# Patient Record
Sex: FEMALE | Race: WHITE
[De-identification: names, ages, dates, MRNs, and addresses within clinical notes are randomized per-mention and may not be internally consistent; named-entity substitution may affect disease eponyms.]

---

## 2019-02-21 ENCOUNTER — HOSPITAL ENCOUNTER (INPATIENT)
Dept: HOSPITAL 84 - D.ER | Age: 74
LOS: 4 days | Discharge: HOME | DRG: 689 | End: 2019-02-25
Attending: FAMILY MEDICINE | Admitting: FAMILY MEDICINE
Payer: COMMERCIAL

## 2019-02-21 VITALS
HEIGHT: 65 IN | BODY MASS INDEX: 23.32 KG/M2 | BODY MASS INDEX: 23.32 KG/M2 | WEIGHT: 140 LBS | WEIGHT: 140 LBS | BODY MASS INDEX: 23.32 KG/M2 | HEIGHT: 65 IN

## 2019-02-21 VITALS — SYSTOLIC BLOOD PRESSURE: 151 MMHG | DIASTOLIC BLOOD PRESSURE: 72 MMHG

## 2019-02-21 VITALS — SYSTOLIC BLOOD PRESSURE: 133 MMHG | DIASTOLIC BLOOD PRESSURE: 66 MMHG

## 2019-02-21 DIAGNOSIS — G30.9: ICD-10-CM

## 2019-02-21 DIAGNOSIS — E87.6: ICD-10-CM

## 2019-02-21 DIAGNOSIS — R50.9: ICD-10-CM

## 2019-02-21 DIAGNOSIS — I10: ICD-10-CM

## 2019-02-21 DIAGNOSIS — R40.2213: ICD-10-CM

## 2019-02-21 DIAGNOSIS — F02.80: ICD-10-CM

## 2019-02-21 DIAGNOSIS — N39.0: Primary | ICD-10-CM

## 2019-02-21 DIAGNOSIS — E11.9: ICD-10-CM

## 2019-02-21 DIAGNOSIS — H54.7: ICD-10-CM

## 2019-02-21 DIAGNOSIS — R41.82: ICD-10-CM

## 2019-02-21 LAB
ALBUMIN SERPL-MCNC: 2.8 G/DL (ref 3.4–5)
ALP SERPL-CCNC: 131 U/L (ref 46–116)
ALT SERPL-CCNC: 15 U/L (ref 10–68)
ANION GAP SERPL CALC-SCNC: 16.6 MMOL/L (ref 8–16)
APPEARANCE UR: CLEAR
BACTERIA #/AREA URNS HPF: (no result) /HPF
BASOPHILS NFR BLD AUTO: 0.1 % (ref 0–2)
BILIRUB SERPL-MCNC: 0.36 MG/DL (ref 0.2–1.3)
BILIRUB SERPL-MCNC: NEGATIVE MG/DL
BUN SERPL-MCNC: 13 MG/DL (ref 7–18)
CALCIUM SERPL-MCNC: 8.1 MG/DL (ref 8.5–10.1)
CHLORIDE SERPL-SCNC: 104 MMOL/L (ref 98–107)
CO2 SERPL-SCNC: 23.9 MMOL/L (ref 21–32)
COLOR UR: YELLOW
CREAT SERPL-MCNC: 1 MG/DL (ref 0.6–1.3)
EOSINOPHIL NFR BLD: 0.1 % (ref 0–7)
ERYTHROCYTE [DISTWIDTH] IN BLOOD BY AUTOMATED COUNT: 13 % (ref 11.5–14.5)
GLOBULIN SER-MCNC: 5.2 G/L
GLUCOSE SERPL-MCNC: 144 MG/DL (ref 74–106)
GLUCOSE SERPL-MCNC: NEGATIVE MG/DL
HCT VFR BLD CALC: 37.7 % (ref 36–48)
HGB BLD-MCNC: 12.5 G/DL (ref 12–16)
IMM GRANULOCYTES NFR BLD: 0.2 % (ref 0–5)
KETONES UR STRIP-MCNC: NEGATIVE MG/DL
LYMPHOCYTES NFR BLD AUTO: 8.8 % (ref 15–50)
MCH RBC QN AUTO: 30.9 PG (ref 26–34)
MCHC RBC AUTO-ENTMCNC: 33.2 G/DL (ref 31–37)
MCV RBC: 93.3 FL (ref 80–100)
MONOCYTES NFR BLD: 4.7 % (ref 2–11)
NEUTROPHILS NFR BLD AUTO: 86.1 % (ref 40–80)
NITRITE UR-MCNC: NEGATIVE MG/ML
OSMOLALITY SERPL CALC.SUM OF ELEC: 283 MOSM/KG (ref 275–300)
PH UR STRIP: 6 [PH] (ref 5–6)
PLATELET # BLD: 217 10X3/UL (ref 130–400)
PMV BLD AUTO: 11.2 FL (ref 7.4–10.4)
POTASSIUM SERPL-SCNC: 3.5 MMOL/L (ref 3.5–5.1)
PROT SERPL-MCNC: 8 G/DL (ref 6.4–8.2)
PROT UR-MCNC: (no result) MG/DL
RBC # BLD AUTO: 4.04 10X6/UL (ref 4–5.4)
RBC #/AREA URNS HPF: (no result) /HPF (ref 0–5)
SODIUM SERPL-SCNC: 141 MMOL/L (ref 136–145)
SP GR UR STRIP: 1.02 (ref 1–1.02)
SQUAMOUS #/AREA URNS HPF: (no result) /HPF (ref 0–5)
TSH SERPL-ACNC: 1.62 UIU/ML (ref 0.36–3.74)
UROBILINOGEN UR-MCNC: NORMAL MG/DL
WBC # BLD AUTO: 10 10X3/UL (ref 4.8–10.8)
WBC #/AREA URNS HPF: (no result) /HPF (ref 0–5)

## 2019-02-21 NOTE — NUR
Pt's adult diaper saturated upon arrival. Pt was changed and cleaned. Wet linen
was removed. Dry blue pads placed beneath pt, and dry adult diaper placed on
pt. Pt has redness to coccyx and groin area.

## 2019-02-21 NOTE — NUR
ADMISSION ASSESSMENT COMPLETED CAREGIVER AT BEDSIDE, PT UNABLE TO ANSWER
QUESTIONS DUE TO MENTAL STATUS, BLIND ABLE TO FOLLOW SOME COMMANDS, IV
INFUSING WITHOUT DIFFICULTY O2 VIA N/C AT 2 L, RESP UNLABOARED LUNGS
DEMINISHED, INCONTIENT OF BOWEL AND BLADDER BED CHANGED, CALL LIGHT IN REACH

## 2019-02-21 NOTE — NUR
PT TRANSPORTED TO FLOOR WITH ISOLATION PRECAUTION DUE TO PT BEING TX FOR FLU.
NOTE PT FLU SWAB WAS NEGATIVE, HOWEVER SINCE BEING TX FOR FLU FOLLOWED
ISOLATION PROTOCOL WITH MASK AND GOWN ON PT. NURSE NOTIFIED OF THIS AT REPORT
AND TRANSPORT.

## 2019-02-21 NOTE — NUR
ARRIVED TO ROOM 2224 VIA STRETCHER AWAKE AND ALERT TO NAME ONLY. RESP EVEN
AND UNLABORED WITH 02 IN USE VIA N/C. NO PAIN OR DISCOMFORT NOTED OR VOICED.
INCONT OF BOWEL AND BLADDER. MEDIUM BM NOTED. PT UNABLE TO WORK CALL LIGHT
SYSTEM HAS CARE GIVER AT BEDSIDE. C/L IN REACH.

## 2019-02-21 NOTE — NUR
Pt resting with family at bedside. Pt respirations even and unlabored. Pt shows
no signs of distress at this time.

## 2019-02-22 VITALS — DIASTOLIC BLOOD PRESSURE: 63 MMHG | SYSTOLIC BLOOD PRESSURE: 133 MMHG

## 2019-02-22 VITALS — DIASTOLIC BLOOD PRESSURE: 50 MMHG | SYSTOLIC BLOOD PRESSURE: 120 MMHG

## 2019-02-22 VITALS — SYSTOLIC BLOOD PRESSURE: 135 MMHG | DIASTOLIC BLOOD PRESSURE: 64 MMHG

## 2019-02-22 VITALS — SYSTOLIC BLOOD PRESSURE: 167 MMHG | DIASTOLIC BLOOD PRESSURE: 87 MMHG

## 2019-02-22 VITALS — DIASTOLIC BLOOD PRESSURE: 58 MMHG | SYSTOLIC BLOOD PRESSURE: 115 MMHG

## 2019-02-22 VITALS — DIASTOLIC BLOOD PRESSURE: 59 MMHG | SYSTOLIC BLOOD PRESSURE: 139 MMHG

## 2019-02-22 LAB
ALBUMIN SERPL-MCNC: 2.4 G/DL (ref 3.4–5)
ALP SERPL-CCNC: 102 U/L (ref 46–116)
ALT SERPL-CCNC: 14 U/L (ref 10–68)
ANION GAP SERPL CALC-SCNC: 14.6 MMOL/L (ref 8–16)
BILIRUB SERPL-MCNC: 0.32 MG/DL (ref 0.2–1.3)
BUN SERPL-MCNC: 13 MG/DL (ref 7–18)
CALCIUM SERPL-MCNC: 7.9 MG/DL (ref 8.5–10.1)
CHLORIDE SERPL-SCNC: 107 MMOL/L (ref 98–107)
CO2 SERPL-SCNC: 24.6 MMOL/L (ref 21–32)
CREAT SERPL-MCNC: 0.8 MG/DL (ref 0.6–1.3)
ERYTHROCYTE [DISTWIDTH] IN BLOOD BY AUTOMATED COUNT: 12.6 % (ref 11.5–14.5)
GLOBULIN SER-MCNC: 4.1 G/L
GLUCOSE SERPL-MCNC: 131 MG/DL (ref 74–106)
HCT VFR BLD CALC: 34.7 % (ref 36–48)
HGB BLD-MCNC: 11.8 G/DL (ref 12–16)
LYMPHOCYTES NFR BLD AUTO: 7.5 % (ref 15–50)
MAGNESIUM SERPL-MCNC: 1.7 MG/DL (ref 1.8–2.4)
MCH RBC QN AUTO: 31.4 PG (ref 26–34)
MCHC RBC AUTO-ENTMCNC: 34 G/DL (ref 31–37)
MCV RBC: 92.3 FL (ref 80–100)
NEUTROPHILS NFR BLD AUTO: 91.5 % (ref 40–80)
OSMOLALITY SERPL CALC.SUM OF ELEC: 286 MOSM/KG (ref 275–300)
PLATELET # BLD: 183 10X3/UL (ref 130–400)
PMV BLD AUTO: 11 FL (ref 7.4–10.4)
POTASSIUM SERPL-SCNC: 3.2 MMOL/L (ref 3.5–5.1)
PROT SERPL-MCNC: 6.5 G/DL (ref 6.4–8.2)
RBC # BLD AUTO: 3.76 10X6/UL (ref 4–5.4)
SODIUM SERPL-SCNC: 143 MMOL/L (ref 136–145)
WBC # BLD AUTO: 8.3 10X3/UL (ref 4.8–10.8)

## 2019-02-22 NOTE — NUR
PATIENT IN BED WITH EYES OPEN AND NO COMPLAINTS OR SIGNS OF DISTRESS AT THIS
TIME. IV INTACT. CALL LIGHT WITHIN REACH.

## 2019-02-22 NOTE — NUR
RECEIVED CARE FROM DAY NURSE.  LYING IN BED WITH CARE GIVER AT SIDE.  NO NEEDS
VOICED AT THIS TIME.  IV INFUSING AS PER ORDER TO PATENT LEFT FA.

## 2019-02-22 NOTE — NUR
Rehab Note- Acute Inpatient Rehab prescreen order received. The patient has
pending PT & ST Evals. WIll follow and visit with the patient & family at this
time. Thank you for this referral!
Amy West RN
CLinical Liaison, [facility]

## 2019-02-23 VITALS — DIASTOLIC BLOOD PRESSURE: 71 MMHG | SYSTOLIC BLOOD PRESSURE: 148 MMHG

## 2019-02-23 VITALS — DIASTOLIC BLOOD PRESSURE: 55 MMHG | SYSTOLIC BLOOD PRESSURE: 141 MMHG

## 2019-02-23 VITALS — SYSTOLIC BLOOD PRESSURE: 136 MMHG | DIASTOLIC BLOOD PRESSURE: 61 MMHG

## 2019-02-23 VITALS — SYSTOLIC BLOOD PRESSURE: 128 MMHG | DIASTOLIC BLOOD PRESSURE: 74 MMHG

## 2019-02-23 VITALS — DIASTOLIC BLOOD PRESSURE: 56 MMHG | SYSTOLIC BLOOD PRESSURE: 110 MMHG

## 2019-02-23 VITALS — DIASTOLIC BLOOD PRESSURE: 70 MMHG | SYSTOLIC BLOOD PRESSURE: 137 MMHG

## 2019-02-23 LAB
ALBUMIN SERPL-MCNC: 2.1 G/DL (ref 3.4–5)
ALP SERPL-CCNC: 84 U/L (ref 46–116)
ALT SERPL-CCNC: 10 U/L (ref 10–68)
ANION GAP SERPL CALC-SCNC: 10.3 MMOL/L (ref 8–16)
BASOPHILS NFR BLD AUTO: 0 % (ref 0–2)
BILIRUB SERPL-MCNC: 0.27 MG/DL (ref 0.2–1.3)
BUN SERPL-MCNC: 12 MG/DL (ref 7–18)
CALCIUM SERPL-MCNC: 8.1 MG/DL (ref 8.5–10.1)
CHLORIDE SERPL-SCNC: 106 MMOL/L (ref 98–107)
CO2 SERPL-SCNC: 24.7 MMOL/L (ref 21–32)
CREAT SERPL-MCNC: 0.8 MG/DL (ref 0.6–1.3)
EOSINOPHIL NFR BLD: 1.5 % (ref 0–7)
ERYTHROCYTE [DISTWIDTH] IN BLOOD BY AUTOMATED COUNT: 13.5 % (ref 11.5–14.5)
GLOBULIN SER-MCNC: 4.4 G/L
GLUCOSE SERPL-MCNC: 80 MG/DL (ref 74–106)
HCT VFR BLD CALC: 32.3 % (ref 36–48)
HGB BLD-MCNC: 10.3 G/DL (ref 12–16)
IMM GRANULOCYTES NFR BLD: 0 % (ref 0–5)
LYMPHOCYTES NFR BLD AUTO: 25.8 % (ref 15–50)
MAGNESIUM SERPL-MCNC: 2.2 MG/DL (ref 1.8–2.4)
MCH RBC QN AUTO: 30.1 PG (ref 26–34)
MCHC RBC AUTO-ENTMCNC: 31.9 G/DL (ref 31–37)
MCV RBC: 94.4 FL (ref 80–100)
MONOCYTES NFR BLD: 6.3 % (ref 2–11)
NEUTROPHILS NFR BLD AUTO: 66.4 % (ref 40–80)
OSMOLALITY SERPL CALC.SUM OF ELEC: 274 MOSM/KG (ref 275–300)
PLATELET # BLD: 199 10X3/UL (ref 130–400)
PMV BLD AUTO: 11.3 FL (ref 7.4–10.4)
POTASSIUM SERPL-SCNC: 3 MMOL/L (ref 3.5–5.1)
PROT SERPL-MCNC: 6.5 G/DL (ref 6.4–8.2)
RBC # BLD AUTO: 3.42 10X6/UL (ref 4–5.4)
SODIUM SERPL-SCNC: 138 MMOL/L (ref 136–145)
WBC # BLD AUTO: 5.9 10X3/UL (ref 4.8–10.8)

## 2019-02-23 NOTE — NUR
PT IS RESTING IN BED WITH EYES CLOSED. RESPIRATIONS ARE EVEN AND UNLABORED. PT
IS EASILY AROUSED WITH VERBAL STIMULATION. THERE IS A CAREGIVER AT PT BEDSIDE.
PT WILL NOT COMMUNICATE AT THIS TIME. THIS IS NOT OUT OF PT "NORMAL" PER PT
CAREGIVER. PT IS REPOSITIONED WITH USE OF PILLOWS FOR SUPPORT. BED IS IN THE
LOWEST POSITION. CALL LIGHT AND BEDSIDE TABLE ARE WITHIN REACH. WILL CONT TO
MONITOR.

## 2019-02-23 NOTE — NUR
RECEIVED CARE FROM DAY NURSE.  LYING IN BED WITH CAREGIVER AT SIDE.  NO NEEDS
VOICED AT THIS TIME.  IV INFUSING AS PER ORDER TO LEFT FA.  CALL LIGHT AT
CAREGIVERS SIDE.

## 2019-02-24 VITALS — DIASTOLIC BLOOD PRESSURE: 60 MMHG | SYSTOLIC BLOOD PRESSURE: 150 MMHG

## 2019-02-24 VITALS — DIASTOLIC BLOOD PRESSURE: 88 MMHG | SYSTOLIC BLOOD PRESSURE: 158 MMHG

## 2019-02-24 VITALS — SYSTOLIC BLOOD PRESSURE: 139 MMHG | DIASTOLIC BLOOD PRESSURE: 79 MMHG

## 2019-02-24 VITALS — SYSTOLIC BLOOD PRESSURE: 151 MMHG | DIASTOLIC BLOOD PRESSURE: 83 MMHG

## 2019-02-24 VITALS — SYSTOLIC BLOOD PRESSURE: 147 MMHG | DIASTOLIC BLOOD PRESSURE: 79 MMHG

## 2019-02-24 LAB
ALBUMIN SERPL-MCNC: 1.9 G/DL (ref 3.4–5)
ALP SERPL-CCNC: 80 U/L (ref 46–116)
ALT SERPL-CCNC: 9 U/L (ref 10–68)
ANION GAP SERPL CALC-SCNC: 13.6 MMOL/L (ref 8–16)
BASOPHILS NFR BLD AUTO: 0.3 % (ref 0–2)
BILIRUB SERPL-MCNC: 0.31 MG/DL (ref 0.2–1.3)
BUN SERPL-MCNC: 8 MG/DL (ref 7–18)
CALCIUM SERPL-MCNC: 7.8 MG/DL (ref 8.5–10.1)
CHLORIDE SERPL-SCNC: 108 MMOL/L (ref 98–107)
CO2 SERPL-SCNC: 22.3 MMOL/L (ref 21–32)
CREAT SERPL-MCNC: 0.8 MG/DL (ref 0.6–1.3)
EOSINOPHIL NFR BLD: 3.5 % (ref 0–7)
ERYTHROCYTE [DISTWIDTH] IN BLOOD BY AUTOMATED COUNT: 13 % (ref 11.5–14.5)
GLOBULIN SER-MCNC: 4.2 G/L
GLUCOSE SERPL-MCNC: 77 MG/DL (ref 74–106)
HCT VFR BLD CALC: 30.5 % (ref 36–48)
HGB BLD-MCNC: 10 G/DL (ref 12–16)
IMM GRANULOCYTES NFR BLD: 0.3 % (ref 0–5)
LYMPHOCYTES NFR BLD AUTO: 32.7 % (ref 15–50)
MAGNESIUM SERPL-MCNC: 1.8 MG/DL (ref 1.8–2.4)
MCH RBC QN AUTO: 30.7 PG (ref 26–34)
MCHC RBC AUTO-ENTMCNC: 32.8 G/DL (ref 31–37)
MCV RBC: 93.6 FL (ref 80–100)
MONOCYTES NFR BLD: 8 % (ref 2–11)
NEUTROPHILS NFR BLD AUTO: 55.2 % (ref 40–80)
OSMOLALITY SERPL CALC.SUM OF ELEC: 275 MOSM/KG (ref 275–300)
PLATELET # BLD: 196 10X3/UL (ref 130–400)
PMV BLD AUTO: 11 FL (ref 7.4–10.4)
POTASSIUM SERPL-SCNC: 3.9 MMOL/L (ref 3.5–5.1)
PROT SERPL-MCNC: 6.1 G/DL (ref 6.4–8.2)
RBC # BLD AUTO: 3.26 10X6/UL (ref 4–5.4)
SODIUM SERPL-SCNC: 140 MMOL/L (ref 136–145)
WBC # BLD AUTO: 3.8 10X3/UL (ref 4.8–10.8)

## 2019-02-24 NOTE — NUR
PT REPOSITIONED USING PILLOWS FOR SUPPORT. CAREGIVER AT BEDSIDE. PT WITH
INCONTINCENCE EPISODE. PT IS CLEANED AND NEW LINENS PLACED. BED IS IN THE
LOWEST POSITION. CALL LIGHT AND BEDSIDE TABLE ARE WITHIN REACH. PT AND PT
CAREGIVER DENY FURTHER NEEDS AT THIS TIME. WILL CONT TO MONITOR.

## 2019-02-24 NOTE — NUR
TAMIFLU IS NOT AVAILABLE AT THIS TIME ON FLOOR. PHARMACY NOTIFIED. WILL WAIT
FOR MED ADMIN FOR TAMIFLU.

## 2019-02-24 NOTE — NUR
IV IN LEFT FA SWOLLEN AND PARTIALLY DISLOADGED.  DC'D WITH TIP INTACT.
RESITED TO RIGHT FA.  2O GUAGE X1 STICK WITH GOOD BLOOD RETURN NOTED.

## 2019-02-24 NOTE — NUR
PT IS SITTING IN BEDSIDE CHAIR. ALERT. RESPIRATIONS ARE EVEN AND UNLABORED.
CAREGIVER IS AT BEDSIDE.

## 2019-02-24 NOTE — NUR
RECEIVED CARE FROM DAY NURSE.  LYING IN BED WITH CAREGIVER AT SIDE.  IV
INFUSING AS PER ORDER TO PATENT RIGHT FA.  CLEANED AT THIS TIME DUE TO BEING
WET.  NO OTHER NEEDS VOICED AT THIS TIME.

## 2019-02-25 VITALS — DIASTOLIC BLOOD PRESSURE: 70 MMHG | SYSTOLIC BLOOD PRESSURE: 148 MMHG

## 2019-02-25 VITALS — SYSTOLIC BLOOD PRESSURE: 147 MMHG | DIASTOLIC BLOOD PRESSURE: 66 MMHG

## 2019-02-25 VITALS — DIASTOLIC BLOOD PRESSURE: 72 MMHG | SYSTOLIC BLOOD PRESSURE: 153 MMHG

## 2019-02-25 VITALS — SYSTOLIC BLOOD PRESSURE: 137 MMHG | DIASTOLIC BLOOD PRESSURE: 64 MMHG

## 2019-02-25 LAB
ALBUMIN SERPL-MCNC: 2.2 G/DL (ref 3.4–5)
ALP SERPL-CCNC: 88 U/L (ref 46–116)
ALT SERPL-CCNC: 14 U/L (ref 10–68)
ANION GAP SERPL CALC-SCNC: 11.3 MMOL/L (ref 8–16)
BASOPHILS NFR BLD AUTO: 0.6 % (ref 0–2)
BILIRUB SERPL-MCNC: 0.29 MG/DL (ref 0.2–1.3)
BUN SERPL-MCNC: 5 MG/DL (ref 7–18)
CALCIUM SERPL-MCNC: 8.4 MG/DL (ref 8.5–10.1)
CHLORIDE SERPL-SCNC: 108 MMOL/L (ref 98–107)
CO2 SERPL-SCNC: 27.3 MMOL/L (ref 21–32)
CREAT SERPL-MCNC: 0.8 MG/DL (ref 0.6–1.3)
EOSINOPHIL NFR BLD: 4 % (ref 0–7)
ERYTHROCYTE [DISTWIDTH] IN BLOOD BY AUTOMATED COUNT: 13 % (ref 11.5–14.5)
GLOBULIN SER-MCNC: 4.5 G/L
GLUCOSE SERPL-MCNC: 83 MG/DL (ref 74–106)
HCT VFR BLD CALC: 33.9 % (ref 36–48)
HGB BLD-MCNC: 11 G/DL (ref 12–16)
IMM GRANULOCYTES NFR BLD: 0.6 % (ref 0–5)
LYMPHOCYTES NFR BLD AUTO: 31.6 % (ref 15–50)
MAGNESIUM SERPL-MCNC: 2 MG/DL (ref 1.8–2.4)
MCH RBC QN AUTO: 30.1 PG (ref 26–34)
MCHC RBC AUTO-ENTMCNC: 32.4 G/DL (ref 31–37)
MCV RBC: 92.6 FL (ref 80–100)
MONOCYTES NFR BLD: 8.8 % (ref 2–11)
NEUTROPHILS NFR BLD AUTO: 54.4 % (ref 40–80)
OSMOLALITY SERPL CALC.SUM OF ELEC: 280 MOSM/KG (ref 275–300)
PLATELET # BLD: 221 10X3/UL (ref 130–400)
PMV BLD AUTO: 10.4 FL (ref 7.4–10.4)
POTASSIUM SERPL-SCNC: 3.6 MMOL/L (ref 3.5–5.1)
PROT SERPL-MCNC: 6.7 G/DL (ref 6.4–8.2)
RBC # BLD AUTO: 3.66 10X6/UL (ref 4–5.4)
SODIUM SERPL-SCNC: 143 MMOL/L (ref 136–145)
WBC # BLD AUTO: 3.5 10X3/UL (ref 4.8–10.8)

## 2019-02-25 NOTE — NUR
PATIENT ESCORTED OUT OF HOSPITAL VIA WC AT THIS TIME BY CNA WITH PERSONAL
BELONGINGS TO PRIVATE VEHICLE.

## 2019-02-25 NOTE — NUR
Rehab Note- THe patient is a total assist at home, she is not a an acute
inpatient rehab candidate. Thank you for this referral!
Amy West RN
Clinical Liaison, [facility]

## 2019-02-26 NOTE — EC
PATIENT:JAMES WALSH                    DATE OF SERVICE: 02/21/19
SEX: F                                  MEDICAL RECORD: [id number]
DATE OF BIRTH: [birth year]                        LOCATION:[id number].MS [id number]
AGE OF PATIENT: 73                             ADMISSION DATE: 02/21/19
 
REFERRING PHYSICIAN:                               
 
INTERPRETING PHYSICIAN: JAYLAN SÁNCHEZ MD             
 
 
 
                             ECHOCARDIOGRAM REPORT
  ECHO CHARGES 4               ECHO COMPLETE                 Date: 02/22/19
 
 
 
CLINICAL DIAGNOSIS: FUO/ALTERED MENTAL            
                    STATUS/CONFUSION,   HX OF HTN 
                         ECHOCARDIOGRAPHIC MEASUREMENTS
      (adult normal given)
   AC root (d.<3.7cm) 3.5  cm   LV Septum d (<1.2 cm> 1.5  cm
      Valve Excursion 1.8  cm     LV Septum (systole) 1.7  cm
Left Atria (s.<4.0cm> 3.6  cm          LVPW d(<1.2cm) 1.6  cm
        RV (d.<2.3cm) 3.4  cm           LVPW (sytole) 2.0  cm
  LV diastole(<5.6CM) 4.3  cm       MV E-F(>70mm/sec)      cm
           LV systole 3.0  cm           LVOT Diameter 1.6  cm
       MV exc.(>10mm)      cm
Est.ejection fraction (50-75%)     %
 
   DOPPLER:
     LVIT      cm/sec A 111.0cm/sec E 70.0  cm/sec
       LA      cm/sec      RVSP 30   mmHg
     LVOT 101  cm/sec   AOP1/2T      m/s
  Asc. Ao 143  cm/sec
     RVOT      cm/sec
       RA      cm/sec
       PA      cm/sec
 AV Gradient Peak 8.22 mmHg  AV Mean 4.43 mmHg  AV Area 1.8  cm
 MV Gradient Peak 5.63 mmHg  MV Mean 1.61 mmHg  MV Area      cm
   COMMENTS:                                              
 
 
 Cardiac Sonographer: 2               DENG BELCHER              
      Cardiologist: 1          Dr. Sánchez                
             TAPE# PACS           
                                       Pericardial Effusion N                        
 
 
DATE OF SERVICE:  
 
FINDINGS:
1. Left ventricular chamber size is within normal limits.  Left ventricular
systolic function is normal.  Overall ejection fraction estimated at 55%.
2. Left atrium, right atrium, and right ventricle chamber sizes are within
normal limits.
3. Valvular structures have normal structure and motion.
4. Doppler interrogation reveals mild tricuspid regurgitation, no other valvular
insufficiency, or stenosis.
 
 
 
ECHOCARDIOGRAM REPORT                          [id number]    JAMES WALSH            
 
 
5. No evidence of pericardial effusion or left ventricular thrombus.
 
TRANSINT:[id number] Voice Confirmation ID: [id number] DOCUMENT ID: [id number]
                                           
                                           JAYLAN SÁNCHEZ MD             
 
 
 
Electronically Signed by JAYLAN SÁNCHEZ on 02/26/19 at 1118
 
 
 
 
 
 
 
 
 
 
 
 
 
 
 
 
 
 
 
 
 
 
 
 
 
 
 
 
 
 
 
 
 
 
 
 
 
CC:                                                             [id number]
DICTATION DATE: 02/22/19 1410     :     02/22/19 2159      DIS IN  
                                                                      02/25/19
[place] [id number] [place] [id number]